# Patient Record
Sex: FEMALE | URBAN - METROPOLITAN AREA
[De-identification: names, ages, dates, MRNs, and addresses within clinical notes are randomized per-mention and may not be internally consistent; named-entity substitution may affect disease eponyms.]

---

## 2019-08-20 ENCOUNTER — OFFICE VISIT (OUTPATIENT)
Dept: URBAN - METROPOLITAN AREA CLINIC 11 | Facility: CLINIC | Age: 69
End: 2019-08-20
Payer: COMMERCIAL

## 2019-08-20 DIAGNOSIS — H43.813 VITREOUS DEGENERATION, BILATERAL: ICD-10-CM

## 2019-08-20 DIAGNOSIS — H40.013 OPEN ANGLE WITH BORDERLINE FINDINGS, LOW RISK, BILATERAL: ICD-10-CM

## 2019-08-20 DIAGNOSIS — E11.9 TYPE 2 DIABETES MELLITUS W/O COMPLICATION: Primary | ICD-10-CM

## 2019-08-20 PROCEDURE — 92004 COMPRE OPH EXAM NEW PT 1/>: CPT | Performed by: OPHTHALMOLOGY

## 2019-08-20 ASSESSMENT — VISUAL ACUITY
OS: 20/25
OD: 20/25

## 2019-08-20 ASSESSMENT — INTRAOCULAR PRESSURE
OD: 19
OS: 19

## 2019-08-20 ASSESSMENT — KERATOMETRY
OD: 45.88
OS: 45.50

## 2019-08-20 NOTE — IMPRESSION/PLAN
Impression: Open angle with borderline findings, low risk, bilateral: H40.013. C/D (PXF OS) Plan: Discussed risk of vision loss with glaucoma and need for close f/u. IOP reasonable for Lonnie Waite 72 appearance OU. Will follow off drops for now. RNFL OCT at next visit.

## 2019-08-20 NOTE — IMPRESSION/PLAN
Impression: Type 2 diabetes mellitus w/o complication: X67.6. Plan: Discussed diagnosis in detail with patient. No background retinopathy, no signs of neovascularization noted. Discussed ocular and systemic benefits of blood sugar control.